# Patient Record
Sex: FEMALE | Race: WHITE | NOT HISPANIC OR LATINO | Employment: OTHER | URBAN - METROPOLITAN AREA
[De-identification: names, ages, dates, MRNs, and addresses within clinical notes are randomized per-mention and may not be internally consistent; named-entity substitution may affect disease eponyms.]

---

## 2021-03-23 ENCOUNTER — TELEPHONE (OUTPATIENT)
Dept: OBGYN CLINIC | Facility: CLINIC | Age: 51
End: 2021-03-23

## 2021-03-24 ENCOUNTER — OFFICE VISIT (OUTPATIENT)
Dept: OBGYN CLINIC | Facility: CLINIC | Age: 51
End: 2021-03-24
Payer: COMMERCIAL

## 2021-03-24 VITALS
WEIGHT: 243 LBS | DIASTOLIC BLOOD PRESSURE: 90 MMHG | SYSTOLIC BLOOD PRESSURE: 140 MMHG | BODY MASS INDEX: 39.05 KG/M2 | HEIGHT: 66 IN

## 2021-03-24 DIAGNOSIS — L02.214 CUTANEOUS ABSCESS OF GROIN: Primary | ICD-10-CM

## 2021-03-24 PROCEDURE — 99202 OFFICE O/P NEW SF 15 MIN: CPT | Performed by: OBSTETRICS & GYNECOLOGY

## 2021-03-24 PROCEDURE — 87205 SMEAR GRAM STAIN: CPT | Performed by: OBSTETRICS & GYNECOLOGY

## 2021-03-24 PROCEDURE — 87070 CULTURE OTHR SPECIMN AEROBIC: CPT | Performed by: OBSTETRICS & GYNECOLOGY

## 2021-03-24 NOTE — PROGRESS NOTES
CC:   Groin abscess    HPI: Alejandro Baker presents for evaluation of a right groin abscess for which she was seen recently at an urgent care center  The patient states that early this morning, this abscess started to drain on its own  She denies any recent history of trauma or any aggravating events that would have led up to this  She is taking Keflex as ordered from the urgent care center  She has also been applying warm compresses  Past Medical History:  Past Medical History:   Diagnosis Date    Abnormal Pap smear of cervix        Past Surgical History:  No past surgical history on file  Past OB/Gyn History:    Noncontributory    ALLERGIES: No Known Allergies    MEDS: No current outpatient medications on file  Review of Systems:  Skin: No rashes or discolorations of any concern  RESP: Denies SOB, no cough  CV: Denies chest pain or palpitations  Breasts: Denies masses, pain, skin changes and nipple discharge  GI: Denies abdominal pain, heartburn, nausea, vomiting, changes in bowel habits  : Denies dysuria, frequency, CVA tenderness, incontinence and hematuria  Genitalia: Denies abnormal vaginal discharge, external lesions, rashes, pelvic pain, pressure, abnormal bleeding  Rectal:  Denies pain, bleeding, hemorrhoids,    Physical Exam:  /90 (BP Location: Right arm, Patient Position: Sitting, Cuff Size: Standard)   Ht 5' 6" (1 676 m)   Wt 110 kg (243 lb)   BMI 39 22 kg/m²    Gen: The patient was alert and oriented x3, pleasant well-appearing female in no acute distress  Pelvic  Normal appearing external female genitalia, no visible lesions, no rashes  Just below and to the outside of the right vulvar area, in the groin, is an obvious abscess that is approximately 3-4 cm in size  There is a central area where there is fluid draining and a culture is obtained  A sterile Q-tip is placed through this opening to try to further encourage drainage    There is erythema and induration surrounding the opening  Assessment & Plan:   1  Right groin abscess which is starting to drain on its own  Patient encouraged to continue taking the antibiotics and to consistently apply warm compresses throughout the day to this area  She will be seen back in 1 week for further follow-up and is to call immediately should she notice any worsening of the abscess in terms of pain, increase in size, or any other concerns

## 2021-03-27 LAB
BACTERIA WND AEROBE CULT: NORMAL
GRAM STN SPEC: NORMAL

## 2021-03-31 ENCOUNTER — OFFICE VISIT (OUTPATIENT)
Dept: OBGYN CLINIC | Facility: CLINIC | Age: 51
End: 2021-03-31
Payer: COMMERCIAL

## 2021-03-31 VITALS
DIASTOLIC BLOOD PRESSURE: 70 MMHG | WEIGHT: 249 LBS | BODY MASS INDEX: 40.02 KG/M2 | HEIGHT: 66 IN | SYSTOLIC BLOOD PRESSURE: 130 MMHG

## 2021-03-31 DIAGNOSIS — L02.214 CUTANEOUS ABSCESS OF GROIN: Primary | ICD-10-CM

## 2021-03-31 PROCEDURE — 99213 OFFICE O/P EST LOW 20 MIN: CPT | Performed by: OBSTETRICS & GYNECOLOGY

## 2021-03-31 NOTE — PROGRESS NOTES
Assessment/Plan:   Vulvar abscess, significantly improved  Patient is to finish out her oral antibiotics  She is to maintain warm compresses till the abscess is shrunken to the point where it is barely palpable  Lastly, she was advised to apply Neosporin cream to the skin area twice a day till seen back in the office for her annual visit which will be in 1 month  There are no diagnoses linked to this encounter  Subjective:     Patient ID: Que Duran is a 52 y o  female  HPI:  Patient is seen again for follow-up of an ulceration and abscess close to the lower aspect of the right vulvar region  The patient reports this is getting significantly better  She also reports on heat flashes and night sweats  Currently she does not take anything for the latter issues  Review of Systems    Only those issues noted above otherwise negative    Objective:     Physical Exam   The abscess just below the lower aspect of the right vulva shows significant improvement as there is almost no erythema  The abscess is significantly smaller  The overlying skin is open but the subcutaneous tissue shows no signs of infection

## 2021-04-29 ENCOUNTER — ANNUAL EXAM (OUTPATIENT)
Dept: OBGYN CLINIC | Facility: CLINIC | Age: 51
End: 2021-04-29
Payer: COMMERCIAL

## 2021-04-29 VITALS
DIASTOLIC BLOOD PRESSURE: 80 MMHG | BODY MASS INDEX: 39.86 KG/M2 | WEIGHT: 248 LBS | HEIGHT: 66 IN | SYSTOLIC BLOOD PRESSURE: 124 MMHG

## 2021-04-29 DIAGNOSIS — Z12.4 CERVICAL CANCER SCREENING: ICD-10-CM

## 2021-04-29 DIAGNOSIS — Z12.31 ENCOUNTER FOR SCREENING MAMMOGRAM FOR MALIGNANT NEOPLASM OF BREAST: ICD-10-CM

## 2021-04-29 DIAGNOSIS — Z01.419 ENCOUNTER FOR GYNECOLOGICAL EXAMINATION WITHOUT ABNORMAL FINDING: Primary | ICD-10-CM

## 2021-04-29 PROCEDURE — 87624 HPV HI-RISK TYP POOLED RSLT: CPT | Performed by: OBSTETRICS & GYNECOLOGY

## 2021-04-29 PROCEDURE — G0145 SCR C/V CYTO,THINLAYER,RESCR: HCPCS | Performed by: OBSTETRICS & GYNECOLOGY

## 2021-04-29 PROCEDURE — 99396 PREV VISIT EST AGE 40-64: CPT | Performed by: OBSTETRICS & GYNECOLOGY

## 2021-04-29 NOTE — PROGRESS NOTES
CC:  Annual exam   HPI: Ailyn Nguyen presents for routine gyn exam   The patient is doing well and expresses no complaints or concerns  Past Medical History:  Past Medical History:   Diagnosis Date    Abnormal Pap smear of cervix        Past Surgical History:  History reviewed  No pertinent surgical history  Past OB/Gyn History:  Menstrual cycles  are regular  Denies any history of sexually transmitted infection  No history of abnormal pap smears  Her last pap smear was over 3 years ago  ALLERGIES: No Known Allergies    MEDS: No current outpatient medications on file      Family History:  Family History   Problem Relation Age of Onset    Diabetes Mother        Social History:  Social History     Socioeconomic History    Marital status: /Civil Union     Spouse name: Not on file    Number of children: Not on file    Years of education: Not on file    Highest education level: Not on file   Occupational History    Not on file   Social Needs    Financial resource strain: Not on file    Food insecurity     Worry: Not on file     Inability: Not on file   [employer] needs     Medical: Not on file     Non-medical: Not on file   Tobacco Use    Smoking status: Never Smoker    Smokeless tobacco: Never Used   Substance and Sexual Activity    Alcohol use: Yes     Comment: social    Drug use: Never    Sexual activity: Yes     Partners: Male   Lifestyle    Physical activity     Days per week: Not on file     Minutes per session: Not on file    Stress: Not on file   Relationships    Social connections     Talks on phone: Not on file     Gets together: Not on file     Attends [employer] service: Not on file     Active member of club or organization: Not on file     Attends meetings of clubs or organizations: Not on file     Relationship status: Not on file    Intimate partner violence     Fear of current or ex partner: Not on file     Emotionally abused: Not on file     Physically abused: Not on file     Forced sexual activity: Not on file   Other Topics Concern    Not on file   Social History Narrative    Not on file         Review of Systems:  Gen:   Denies fatigue, chills, nausea, vomiting, fever  Skin: No rashes or discolorations of any concern  RESP: Denies SOB, no cough  CV: Denies chest pain or palpitations  Breasts: Denies masses, pain, skin changes and nipple discharge  GI: Denies abdominal pain, heartburn, nausea, vomiting, changes in bowel habits  : Denies dysuria, frequency, CVA tenderness, incontinence and hematuria  Genitalia: Denies abnormal vaginal discharge, external lesions, rashes, pelvic pain, pressure, abnormal bleeding  Rectal:  Denies pain, bleeding, hemorrhoids,    Physical Exam:  /80 (BP Location: Left arm, Patient Position: Sitting, Cuff Size: Standard)   Ht 5' 6" (1 676 m)   Wt 112 kg (248 lb)   LMP 04/03/2021   BMI 40 03 kg/m²    Gen: The patient was alert and oriented x3, pleasant well-appearing female in no acute distress  Neck:  Unremarkable, no lymphadenopathy, no thyromegaly, or tenderness  CV:  RRR, no murmurs  Resp:  Clear to auscultation bilaterally, no wheezing  Breasts: Symmetric  No dominant, discrete, fixed  or suspicious masses are noted  No skin or nipple changes  No palpable axillary nodes  No supraclavicular adenopathy  Abd:  Soft, overweight, nontender, nondistended, no masses or organomegaly  Back:  No CVA tenderness, no tenderness to palpation along spine  Pelvic:  Normal appearing external female genitalia, no visible lesions, no rashes  Vagina is free of discharge, normal vaginal epithelium, no abnormal  lesions, no evidence of prolapse anteriorly or posteriorly  Normal appearing cervix, mobile and nontender  A thin prep pap smear was obtained today  Uterus is normal size, mobile and, nontender  No palpable adnexal masses or tenderness  No anoperineal lesions     Rectal:  No masses, tenderness, hemorrhoids, or obvious blood  Skin:  No concerning lesions  Extremeties: No edema      Assessment & Plan:   1  Routine annual exam      RTO one year orPRN  2  Encounter for screening mammogram, referral for mammogram given to patient  3   Cervical cancer screening, Pap smear was obtained today

## 2021-05-04 LAB
HPV HR 12 DNA CVX QL NAA+PROBE: NEGATIVE
HPV16 DNA CVX QL NAA+PROBE: NEGATIVE
HPV18 DNA CVX QL NAA+PROBE: NEGATIVE

## 2021-05-06 LAB
LAB AP GYN PRIMARY INTERPRETATION: NORMAL
Lab: NORMAL
PATH INTERP SPEC-IMP: NORMAL